# Patient Record
Sex: MALE | Race: WHITE
[De-identification: names, ages, dates, MRNs, and addresses within clinical notes are randomized per-mention and may not be internally consistent; named-entity substitution may affect disease eponyms.]

---

## 2019-02-04 PROBLEM — Z00.00 ENCOUNTER FOR PREVENTIVE HEALTH EXAMINATION: Status: ACTIVE | Noted: 2019-02-04

## 2019-02-07 ENCOUNTER — APPOINTMENT (OUTPATIENT)
Dept: PAIN MANAGEMENT | Facility: CLINIC | Age: 39
End: 2019-02-07
Payer: COMMERCIAL

## 2019-02-07 VITALS
BODY MASS INDEX: 23.7 KG/M2 | WEIGHT: 160 LBS | SYSTOLIC BLOOD PRESSURE: 113 MMHG | DIASTOLIC BLOOD PRESSURE: 70 MMHG | HEIGHT: 69 IN | HEART RATE: 74 BPM

## 2019-02-07 DIAGNOSIS — G44.009 CLUSTER HEADACHE SYNDROME, UNSPECIFIED, NOT INTRACTABLE: ICD-10-CM

## 2019-02-07 PROCEDURE — 99205 OFFICE O/P NEW HI 60 MIN: CPT

## 2019-02-07 RX ORDER — SUMATRIPTAN SUCCINATE 6 MG/.5ML
6 INJECTION SUBCUTANEOUS
Qty: 12 | Refills: 5 | Status: ACTIVE | COMMUNITY
Start: 2019-02-07 | End: 1900-01-01

## 2019-02-07 RX ORDER — SUMATRIPTAN 20 MG/1
SPRAY NASAL
Refills: 0 | Status: ACTIVE | COMMUNITY

## 2019-02-07 NOTE — ASSESSMENT
[FreeTextEntry1] : 37 y/o male patient with periodic severe HAs presents today for an initial evaluation. His examination today is nonfocal. Following review of his medical record, which shows a history of cluster HAs, it is further concluded that his severe HAs are cluster HAs. The minor HAs are not migraines since there is no nausea associated with them, only occasionally with the cluster HAs. A lengthy discussion regarding various treatment options was held, he was given ample time to ask questions and all questions were answered thoroughly. \par \par It is recommended that: \par 1) Verapamil be continued at this time. EKGs must be completed regularly if the dosage is increased. \par 2) Depakote not be started at this time to control the minor HAs due to possible AE. \par 3) The use of Excedrin be limited to 8 a month to avoid analgesic rebound. Aleve can be taken as a preventative for analgesic rebound. \par 4) Infusions are another option that can be used to break the cycle of the minor HAs. \par 5) Magnesium can be taken daily as a preventative for HAs. \par 6) Imitrex and Sumatriptan will also be continued at this time. \par 7) He will follow up in 6-8 weeks or sooner if clinically indicated.

## 2019-02-07 NOTE — END OF VISIT
[>50% of Time Spent on Counseling for ____] : Greater than 50% of the encounter time was spent on counseling for [unfilled] [Time Spent: ___ minutes] : I have spent [unfilled] minutes of face to face time with the patient [FreeTextEntry3] : This note was authored by Job Ugarte working as a medical scribe for Dr. Maury Sierra. The note was reviewed, edited, and revised by Dr. Maury Sierra who is in agreement with the exam findings, and treatment plan. (2/7/19)

## 2019-02-07 NOTE — PHYSICAL EXAM
[General Appearance - Alert] : alert [General Appearance - In No Acute Distress] : in no acute distress [Oriented To Time, Place, And Person] : oriented to person, place, and time [Affect] : the affect was normal [Mood] : the mood was normal [Person] : oriented to person [Place] : oriented to place [Time] : oriented to time [Span Intact] : the attention span was normal [Concentration Intact] : normal concentrating ability [Visual Intact] : visual attention was ~T not ~L decreased [Fluency] : fluency intact [Comprehension] : comprehension intact [Reading] : reading intact [Current Events] : adequate knowledge of current events [Past History] : adequate knowledge of personal past history [Vocabulary] : adequate range of vocabulary [Cranial Nerves Optic (II)] : visual acuity intact bilaterally,  visual fields full to confrontation, pupils equal round and reactive to light [Cranial Nerves Oculomotor (III)] : extraocular motion intact [Cranial Nerves Trigeminal (V)] : facial sensation intact symmetrically [Cranial Nerves Facial (VII)] : face symmetrical [Cranial Nerves Vestibulocochlear (VIII)] : hearing was intact bilaterally [Motor Tone] : muscle tone was normal in all four extremities [Motor Strength] : muscle strength was normal in all four extremities [Involuntary Movements] : no involuntary movements were seen [Balance] : balance was intact [Non-ambulatory] : Non-ambulatory [Sclera] : the sclera and conjunctiva were normal [PERRL With Normal Accommodation] : pupils were equal in size, round, reactive to light, with normal accommodation [Extraocular Movements] : extraocular movements were intact [Outer Ear] : the ears and nose were normal in appearance [Neck Appearance] : the appearance of the neck was normal [Abnormal Walk] : normal gait [Skin Color & Pigmentation] : normal skin color and pigmentation [] : no rash

## 2019-04-01 ENCOUNTER — APPOINTMENT (OUTPATIENT)
Dept: PAIN MANAGEMENT | Facility: CLINIC | Age: 39
End: 2019-04-01

## 2019-04-02 RX ORDER — VERAPAMIL HYDROCHLORIDE 120 MG/1
120 CAPSULE, EXTENDED RELEASE ORAL
Qty: 30 | Refills: 2 | Status: ACTIVE | COMMUNITY
Start: 2019-04-02 | End: 1900-01-01

## 2019-05-15 ENCOUNTER — MESSAGE (OUTPATIENT)
Age: 39
End: 2019-05-15

## 2019-05-15 RX ORDER — VERAPAMIL HYDROCHLORIDE 240 MG/1
240 TABLET ORAL
Qty: 60 | Refills: 2 | Status: ACTIVE | COMMUNITY
Start: 1900-01-01 | End: 1900-01-01

## 2020-11-10 ENCOUNTER — TRANSCRIPTION ENCOUNTER (OUTPATIENT)
Age: 40
End: 2020-11-10

## 2020-11-10 ENCOUNTER — APPOINTMENT (OUTPATIENT)
Dept: PAIN MANAGEMENT | Facility: CLINIC | Age: 40
End: 2020-11-10

## 2020-11-10 VITALS — TEMPERATURE: 97.4 F

## 2023-03-09 NOTE — DATA REVIEWED
Pt was seen in office today. Rx changed.    [FreeTextEntry1] : Pt's I-STOP was reviewed and discussed during today's visit. Ref # [id number]

## 2024-03-12 ENCOUNTER — APPOINTMENT (OUTPATIENT)
Dept: ORTHOPEDIC SURGERY | Facility: CLINIC | Age: 44
End: 2024-03-12
Payer: COMMERCIAL

## 2024-03-12 VITALS — HEIGHT: 69 IN | BODY MASS INDEX: 23.7 KG/M2 | WEIGHT: 160 LBS

## 2024-03-12 DIAGNOSIS — Z78.9 OTHER SPECIFIED HEALTH STATUS: ICD-10-CM

## 2024-03-12 DIAGNOSIS — S63.650A SPRAIN OF METACARPOPHALANGEAL JOINT OF RIGHT INDEX FINGER, INITIAL ENCOUNTER: ICD-10-CM

## 2024-03-12 PROCEDURE — 29280 STRAPPING OF HAND OR FINGER: CPT

## 2024-03-12 PROCEDURE — 73130 X-RAY EXAM OF HAND: CPT | Mod: RT

## 2024-03-12 PROCEDURE — 99204 OFFICE O/P NEW MOD 45 MIN: CPT | Mod: 25

## 2024-03-12 NOTE — HISTORY OF PRESENT ILLNESS
[Sudden] : sudden [0] : 0 [5] : 5 [Occasional] : occasional [Dull/Aching] : dull/aching [Leisure] : leisure [Rest] : rest [de-identified] : 3/12/24:  Pt has had pain in right index finger MP for 6 months.  Denies trauma. RHD, computer work [] : no [FreeTextEntry1] : Right Index Finger [FreeTextEntry5] : Patient ALLISON WAGONER is 43 years old and is being evaluated for the RIGHT INDEX FINGER after experiencing pain. [de-identified] : Pressures

## 2024-03-12 NOTE — IMAGING
[de-identified] : right index finger: no swelling/ecchymosis ttp over MP pain with stress of RCL, no opening farom nvid  xrays 3 views hand show no bony pathology

## 2024-03-12 NOTE — ASSESSMENT
[FreeTextEntry1] : The patient was advised of the diagnosis. The natural history of the pathology was explained in full to the patient in layman's terms. We discussed that the patient would be better off with early protected range of motion.  Splinting leads to a high incidence of stiffness.  I recommend eric strapping(strap placed) and early range of motion exercises x 3weeks.  If motion is near normal the patient can continue with a home exercise program.  If the patient is having a lot of difficulty with self directed exercises, therapy may be recommended.  All questions were answered. The risks and benefits of surgical and non-surgical treatment alternatives were explained in full to the patient.  The affected finger is noted to be clean and dry.  A eric loop was applied to the affected finger over the middle phalanx. It was then strapped to the adjacent finger in overlapping fashion. Fit and pressure were assessed as the strapping was applied and stopped when the desired amount of support was achieved  F/u in 3 weeks for xrays

## 2024-10-08 ENCOUNTER — NON-APPOINTMENT (OUTPATIENT)
Age: 44
End: 2024-10-08

## 2024-10-09 ENCOUNTER — APPOINTMENT (OUTPATIENT)
Dept: PULMONOLOGY | Facility: CLINIC | Age: 44
End: 2024-10-09

## 2025-04-22 ENCOUNTER — APPOINTMENT (OUTPATIENT)
Dept: ORTHOPEDIC SURGERY | Facility: CLINIC | Age: 45
End: 2025-04-22
Payer: COMMERCIAL

## 2025-04-22 DIAGNOSIS — S63.639A SPRAIN OF INTERPHALANGEAL JOINT OF UNSPECIFIED FINGER, INITIAL ENCOUNTER: ICD-10-CM

## 2025-04-22 DIAGNOSIS — S63.632A SPRAIN OF INTERPHALANGEAL JOINT OF RIGHT MIDDLE FINGER, INITIAL ENCOUNTER: ICD-10-CM

## 2025-04-22 PROCEDURE — 99213 OFFICE O/P EST LOW 20 MIN: CPT

## 2025-04-22 PROCEDURE — 73140 X-RAY EXAM OF FINGER(S): CPT | Mod: RT
